# Patient Record
Sex: MALE | Race: WHITE | Employment: OTHER | URBAN - METROPOLITAN AREA
[De-identification: names, ages, dates, MRNs, and addresses within clinical notes are randomized per-mention and may not be internally consistent; named-entity substitution may affect disease eponyms.]

---

## 2022-05-25 ENCOUNTER — HOSPITAL ENCOUNTER (OUTPATIENT)
Dept: ENDOSCOPY | Age: 62
Setting detail: OUTPATIENT SURGERY
Discharge: HOME | End: 2022-05-25
Payer: COMMERCIAL

## 2022-05-25 VITALS
BODY MASS INDEX: 27.09 KG/M2 | TEMPERATURE: 98.5 F | SYSTOLIC BLOOD PRESSURE: 119 MMHG | WEIGHT: 200 LBS | HEIGHT: 72 IN | HEART RATE: 59 BPM | RESPIRATION RATE: 16 BRPM | DIASTOLIC BLOOD PRESSURE: 71 MMHG

## 2022-05-25 PROCEDURE — 66821 AFTER CATARACT LASER SURGERY: CPT

## 2022-05-25 PROCEDURE — 6370000000 HC RX 637 (ALT 250 FOR IP): Performed by: OPHTHALMOLOGY

## 2022-05-25 PROCEDURE — 2500000003 HC RX 250 WO HCPCS: Performed by: OPHTHALMOLOGY

## 2022-05-25 RX ORDER — APRACLONIDINE HYDROCHLORIDE 5 MG/ML
1 SOLUTION/ DROPS OPHTHALMIC 2 TIMES DAILY PRN
Status: DISCONTINUED | OUTPATIENT
Start: 2022-05-25 | End: 2022-05-26 | Stop reason: HOSPADM

## 2022-05-25 RX ORDER — TROPICAMIDE 10 MG/ML
1 SOLUTION/ DROPS OPHTHALMIC ONCE
Status: COMPLETED | OUTPATIENT
Start: 2022-05-25 | End: 2022-05-25

## 2022-05-25 RX ORDER — SOFT LENS RINSE,STORE SOLUTION
SOLUTION, NON-ORAL MISCELLANEOUS ONCE
Status: COMPLETED | OUTPATIENT
Start: 2022-05-25 | End: 2022-05-25

## 2022-05-25 RX ORDER — PROPARACAINE HYDROCHLORIDE 5 MG/ML
1 SOLUTION/ DROPS OPHTHALMIC ONCE
Status: COMPLETED | OUTPATIENT
Start: 2022-05-25 | End: 2022-05-25

## 2022-05-25 RX ORDER — PHENYLEPHRINE HCL 2.5 %
1 DROPS OPHTHALMIC (EYE) ONCE
Status: COMPLETED | OUTPATIENT
Start: 2022-05-25 | End: 2022-05-25

## 2022-05-25 RX ADMIN — PROPARACAINE HYDROCHLORIDE 1 DROP: 5 SOLUTION/ DROPS OPHTHALMIC at 12:19

## 2022-05-25 RX ADMIN — APRACLONIDINE 1 DROP: 5.75 SOLUTION OPHTHALMIC at 12:41

## 2022-05-25 RX ADMIN — Medication: at 12:37

## 2022-05-25 RX ADMIN — TROPICAMIDE 1 DROP: 10 SOLUTION/ DROPS OPHTHALMIC at 12:11

## 2022-05-25 RX ADMIN — PHENYLEPHRINE HYDROCHLORIDE 1 DROP: 25 SOLUTION/ DROPS OPHTHALMIC at 12:12

## 2022-05-25 RX ADMIN — APRACLONIDINE 1 DROP: 5.75 SOLUTION OPHTHALMIC at 12:14

## 2022-05-25 ASSESSMENT — PAIN SCALES - GENERAL
PAINLEVEL_OUTOF10: 0
PAINLEVEL_OUTOF10: 0

## 2022-05-25 NOTE — OP NOTE
Rolando Chavez    Pre-operative diagnosis:  Opacified posterior capsule of the the left eye    Post-operative diagnosis:  Same    Procedure Performed:  YAG laser capsulotomy the left eye                                      Anesthesia:  Topical anesthesia     Complications:  None    Procedure:  Informed consent was obtained from the patient. Dilation drops were then applied to induce adequate mydriasis. The patient was then taken to the laser room and  positioned in front of the YAG laser. Topical Proparacaine drops were then applied. The patient then received 41 applications of 2.0 millijoules to the densely opacified posterior capsule. Once this was accomplished, a nice capsulotomy was performed. The eye was then rinsed using sterile saline. Iopidine 0.5% was then applied. The patient's pressure will be measured closely and conservatively as an outpatient in my office. Patient tolerated the procedure well without any complications.       Patient: Rolando Chavez  YOB: 1960  MRN: [id number]    Date of Procedure: 05/25/2022      Electronically signed by Penelope Mustafa MD on 5/25/2022 at 12:10 PM

## 2022-05-25 NOTE — PROGRESS NOTES
Pt here for laser eye procedure with Dr. Paramjit Beverly  Procedure explained to pt and consent obtained  Laser eye procedure completed  See Dr. Jeet Wu procedural note for details  Pt nani well  No c/o's voiced   Discharge instructions reviewed with pt and copy given  Understanding verbalized  Pt discharged to home in stable condition